# Patient Record
Sex: FEMALE | Race: ASIAN | Employment: FULL TIME | URBAN - METROPOLITAN AREA
[De-identification: names, ages, dates, MRNs, and addresses within clinical notes are randomized per-mention and may not be internally consistent; named-entity substitution may affect disease eponyms.]

---

## 2018-02-21 ENCOUNTER — HOSPITAL ENCOUNTER (OUTPATIENT)
Dept: PHYSICAL THERAPY | Age: 43
Discharge: HOME | End: 2018-02-21
Payer: OTHER GOVERNMENT

## 2018-02-21 PROCEDURE — 97162 PT EVAL MOD COMPLEX 30 MIN: CPT | Performed by: PHYSICAL THERAPIST

## 2018-02-21 PROCEDURE — 97110 THERAPEUTIC EXERCISES: CPT | Performed by: PHYSICAL THERAPIST

## 2018-02-21 PROCEDURE — 97116 GAIT TRAINING THERAPY: CPT | Performed by: PHYSICAL THERAPIST

## 2018-02-21 NOTE — PROGRESS NOTES
PHYSICAL THERAPY - DAILY TREATMENT NOTE    Patient Name: Edwige Guardado        Date: 2018  : 1975   YES Patient  Verified  Visit #:     Insurance: Payor: JAVI / Plan: Erick Sung 74 / Product Type:  /      In time: 1:05 Out time: 2:00   Total Treatment Time: 55     Medicare Time Tracking (below)   Total Timed Codes (min):  na 1:1 Treatment Time:  na     TREATMENT AREA = Left knee pain [M25.562]    SUBJECTIVE  Pain Level (on 0 to 10 scale):  3  / 10   Medication Changes/New allergies or changes in medical history, any new surgeries or procedures?     NO    If yes, update Summary List   Subjective Functional Status/Changes:  []  No changes reported     See eval/POC          OBJECTIVE  Modalities Rationale:     decrease edema, decrease inflammation and decrease pain to improve patient's ability to prevent soreness   min [] Estim, type/location:                                      []  att     []  unatt     []  w/US     []  w/ice    []  w/heat    min []  Mechanical Traction: type/lbs                   []  pro   []  sup   []  int   []  cont    []  before manual    []  after manual    min []  Ultrasound, settings/location:      min []  Iontophoresis w/ dexamethasone, location:                                               []  take home patch       []  in clinic   10 min [x]  Ice     []  Heat    location/position:     min []  Vasopneumatic Device, press/temp:     min []  Other:    [] Skin assessment post-treatment (if applicable):    []  intact    []  redness- no adverse reaction     []redness  adverse reaction:        10 min Therapeutic Exercise:  [x]  See flow sheet   Rationale:      increase ROM, increase strength and improve coordination to improve the patients ability to regain normal walking tolerance        min Therapeutic Activity:         min Neuromuscular Re-ed:        10 min Gait Training: Emphasis on equal steps, and knee extension/heel strike at initial contact      min Patient Education:  YES  Reviewed HEP   []  Progressed/Changed HEP based on: Other Objective/Functional Measures:    FOTO - 38     Post Treatment Pain Level (on 0 to 10) scale:   1  / 10     ASSESSMENT  Assessment/Changes in Function:     Pt has pain, ROM loss, weakness, and significant swelling s/p left knee arthroscopy. []  See Progress Note/Recertification   Patient will continue to benefit from skilled PT services to modify and progress therapeutic interventions, address functional mobility deficits, address ROM deficits, address strength deficits, analyze and address soft tissue restrictions, analyze and cue movement patterns, analyze and modify body mechanics/ergonomics and assess and modify postural abnormalities to attain remaining goals. Progress toward goals / Updated goals:    Goals established today     PLAN  [x]  Upgrade activities as tolerated YES Continue plan of care   []  Discharge due to :    []  Other:      Therapist: Joyce Siegel PT    Date: 2/21/2018 Time: 10:57 AM     No future appointments.

## 2018-02-21 NOTE — PROGRESS NOTES
[address]  [facility]  [facility]  [address] ,[address], [address]  Phone: [phone]  Fax: [phone] / [address]  Patient Name: Eliane Joe : 1975   Medical   Diagnosis: Left knee pain [M25.562] Treatment Diagnosis: L Knee Pain   Onset Date: 18     Referral Source: Juan J Roblero MD [facility] 2018   Prior Hospitalization: See medical history Provider #: [id number]   Prior Level of Function: Painfully limited squatting, distance walking, and exercises   Comorbidities: Thyroid dysfunction   Medications: Verified on Patient Summary List   The Plan of Care and following information is based on the information from the initial evaluation.   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 Pt walks into clinic without AD, and is limping due to lacking full knee extension. She is wearing a compression wrap, but still has significant swelling focal to knee joint area. Pt has some red drainage from her portal sites, and was cautioned to contact MD if drainage increases. She required some stretching into extension before being able to perform a quad set. She was able to perform SLR without a lag after practice. She was discouraged from excessive flexion due to continued drainage, but was seen to flex to 90 degrees while seated.   Pt has ROM loss, swelling, pain, weakness, and altered gait s/p left knee arthroscopic debridement.  ===========================================================================================  Eval Complexity: History MEDIUM  Complexity : 1-2 comorbidities / personal factors will impact the outcome/ POC ;  Examination  MEDIUM Complexity : 3 Standardized tests and measures addressing body structure, function, activity limitation and / or participation in recreation ; Presentation MEDIUM Complexity : Evolving with changing characteristics ; Decision Making MEDIUM Complexity : FOTO score of 26-74; Overall Complexity MEDIUM  Problem List: pain affecting function, decrease ROM, decrease strength, edema affecting function, impaired gait/ balance, decrease ADL/ functional abilitiies, decrease activity tolerance, decrease flexibility/ joint mobility and decrease transfer abilities   Treatment Plan may include any combination of the following: Therapeutic exercise, Therapeutic activities, Neuromuscular re-education, Physical agent/modality, Gait/balance training, Manual therapy, Dry Needling, Aquatic therapy, Patient education, Self Care training, Functional mobility training, Home safety training and Stair training  Patient / Family readiness to learn indicated by: asking questions, trying to perform skills and interest  Persons(s) to be included in education: patient (P)  Barriers to Learning/Limitations: None  Measures taken:    Patient Goal (s): \"recovery as soon as possible\"   Patient self reported health status: excellent  Rehabilitation Potential: good   Short Term Goals: To be accomplished in  2  weeks:  1. Pt able to walk without AD with equal gait components bilaterally. 2. PT independent with basic HEP including RICE principle for edema management.  Long Term Goals: To be accomplished in  6  weeks:  1. Pt to increase FOTO score from 38 to >/= 57.  2. Pt to have full left knee PROM 0-130 degrees.   3. Pt independent with high level HEP for left knee flexibility, strengthening/endurance, proprioception and gradual return to prior exercise program.  Frequency / Duration:   Patient to be seen  2-3  times per week for 6  weeks:  Patient / Caregiver education and instruction: self care, activity modification and exercises  G-Codes (GP): na  Therapist Signature: Zenon Ty, PT Date: 9/34/3943   Certification Period: na Time: 10:57 AM   ===========================================================================================  I certify that the above Physical Therapy Services are being furnished while the patient is under my care. I agree with the treatment plan and certify that this therapy is necessary. Physician Signature:        Date:       Time:     Please sign and return to [facility] or you may fax the signed copy to [phone]. Thank you.

## 2018-02-22 ENCOUNTER — HOSPITAL ENCOUNTER (OUTPATIENT)
Dept: PHYSICAL THERAPY | Age: 43
Discharge: HOME | End: 2018-02-22
Payer: OTHER GOVERNMENT

## 2018-02-22 PROCEDURE — 97110 THERAPEUTIC EXERCISES: CPT | Performed by: PHYSICAL THERAPIST

## 2018-02-22 PROCEDURE — 97014 ELECTRIC STIMULATION THERAPY: CPT | Performed by: PHYSICAL THERAPIST

## 2018-02-22 NOTE — PROGRESS NOTES
PHYSICAL THERAPY - DAILY TREATMENT NOTE    Patient Name: Aranza Bertrand        Date: 2018  : 1975   YES Patient  Verified  Visit #:   2   of   18  Insurance: Payor: JAVI / Plan: Zehra Abdullahi / Product Type:  /      In time: 11:30 Out time: 12:15   Total Treatment Time: 45     Medicare Time Tracking (below)   Total Timed Codes (min):  na 1:1 Treatment Time:  na     TREATMENT AREA = Left knee pain [M25.562]    SUBJECTIVE  Pain Level (on 0 to 10 scale):  2  / 10   Medication Changes/New allergies or changes in medical history, any new surgeries or procedures? NO    If yes, update Summary List   Subjective Functional Status/Changes:  []  No changes reported     Pt reports able to walk better since last session. She has been using the cold pack more at home.           OBJECTIVE  Modalities Rationale:     decrease edema, decrease inflammation and decrease pain to improve patient's ability to prevent soreness/swelling  15 min [x] Estim, type/location: L Knee IFC - supine after treatment                                     []  att     [x]  unatt     []  w/US     []  w/ice    []  w/heat    min []  Mechanical Traction: type/lbs                   []  pro   []  sup   []  int   []  cont    []  before manual    []  after manual    min []  Ultrasound, settings/location:      min []  Iontophoresis w/ dexamethasone, location:                                               []  take home patch       []  in clinic    min []  Ice     []  Heat    location/position:     min []  Vasopneumatic Device, press/temp:     min []  Other:    [] Skin assessment post-treatment (if applicable):    []  intact    []  redness- no adverse reaction     []redness  adverse reaction:        30 min Therapeutic Exercise:  [x]  See flow sheet   Rationale:      increase ROM, increase strength, improve coordination and increase proprioception to improve the patients ability to regain normal walking       min Therapeutic Activity:         min Neuromuscular Re-ed:         min Gait Training:       min Patient Education:  YES  Reviewed HEP   []  Progressed/Changed HEP based on: Other Objective/Functional Measures:    Pt has only scant drainage from portal sites. Post Treatment Pain Level (on 0 to 10) scale:   2  / 10     ASSESSMENT  Assessment/Changes in Function:     Pt able to perform quad sets and SLR with more control today      []  See Progress Note/Recertification   Patient will continue to benefit from skilled PT services to modify and progress therapeutic interventions, address functional mobility deficits, address ROM deficits, address strength deficits, analyze and address soft tissue restrictions, analyze and cue movement patterns, analyze and modify body mechanics/ergonomics and assess and modify postural abnormalities to attain remaining goals. Progress toward goals / Updated goals:     Will continue to gradually progress therex as pain/swelling allows     PLAN  [x]  Upgrade activities as tolerated YES Continue plan of care   []  Discharge due to :    []  Other:      Therapist: Evelia Levi, PT    Date: 2/22/2018 Time: 9:35 AM     Future Appointments  Date Time Provider Paul Martinez   2/22/2018 11:30 AM Evelia Levi, PT [facility] [place]

## 2018-03-01 ENCOUNTER — HOSPITAL ENCOUNTER (OUTPATIENT)
Dept: PHYSICAL THERAPY | Age: 43
Discharge: HOME | End: 2018-03-01
Payer: OTHER GOVERNMENT

## 2018-03-01 PROCEDURE — 97014 ELECTRIC STIMULATION THERAPY: CPT | Performed by: PHYSICAL THERAPIST

## 2018-03-01 PROCEDURE — 97110 THERAPEUTIC EXERCISES: CPT | Performed by: PHYSICAL THERAPIST

## 2018-03-01 NOTE — PROGRESS NOTES
PHYSICAL THERAPY - DAILY TREATMENT NOTE    Patient Name: Edwige Guardado        Date: 3/1/2018  : 1975   YES Patient  Verified  Visit #:   3   of   18  Insurance: Payor: JAVI / Plan: Erick Sung 74 / Product Type:  /      In time: 11:35 Out time: 12:30   Total Treatment Time: 55     Medicare Time Tracking (below)   Total Timed Codes (min):  na 1:1 Treatment Time:  na     TREATMENT AREA = Left knee pain [M25.562]    SUBJECTIVE  Pain Level (on 0 to 10 scale):  3  / 10   Medication Changes/New allergies or changes in medical history, any new surgeries or procedures? NO    If yes, update Summary List   Subjective Functional Status/Changes:  []  No changes reported     Pt reports seeing MD and had sutures removed. She is having less swelling in her knee, and she has returned to work.           OBJECTIVE  Modalities Rationale:     decrease edema, decrease inflammation and decrease pain to improve patient's ability to prevent pain/soreness  15 min [x] Estim, type/location: IFC to left knee - supine after treatment                                     []  att     [x]  unatt     []  w/US     [x]  w/ice    []  w/heat    min []  Mechanical Traction: type/lbs                   []  pro   []  sup   []  int   []  cont    []  before manual    []  after manual    min []  Ultrasound, settings/location:      min []  Iontophoresis w/ dexamethasone, location:                                               []  take home patch       []  in clinic    min []  Ice     []  Heat    location/position:     min []  Vasopneumatic Device, press/temp:     min []  Other:    [] Skin assessment post-treatment (if applicable):    []  intact    []  redness- no adverse reaction     []redness  adverse reaction:        40 min Therapeutic Exercise:  [x]  See flow sheet   Rationale:      increase ROM, increase strength, improve coordination and improve balance to improve the patients ability to reagin normal walking/stair climbing      min Therapeutic Activity:         min Neuromuscular Re-ed:         min Gait Training:       min Patient Education:  YES  Reviewed HEP   []  Progressed/Changed HEP based on: Other Objective/Functional Measures: Added 4 inch step, TKE w T-Band     Post Treatment Pain Level (on 0 to 10) scale:   2 / 10     ASSESSMENT  Assessment/Changes in Function:     Pt tolerated all treatment well. She had some difficulty with new step ups today, and was encouraged to use cold after performing reps. []  See Progress Note/Recertification   Patient will continue to benefit from skilled PT services to modify and progress therapeutic interventions, address functional mobility deficits, address ROM deficits, address strength deficits, analyze and address soft tissue restrictions, analyze and cue movement patterns, analyze and modify body mechanics/ergonomics and assess and modify postural abnormalities to attain remaining goals. Progress toward goals / Updated goals: Will continue to progress strength/endurance and walking tolerance as tolerated.       PLAN  [x]  Upgrade activities as tolerated YES Continue plan of care   []  Discharge due to :    []  Other:      Therapist: Justin Severs, PT    Date: 3/1/2018 Time: 9:35 AM     Future Appointments  Date Time Provider Paul Martinez   3/1/2018 11:30 AM Justin Severs, [place] [facility] [place]